# Patient Record
Sex: MALE | Race: WHITE | Employment: FULL TIME | URBAN - METROPOLITAN AREA
[De-identification: names, ages, dates, MRNs, and addresses within clinical notes are randomized per-mention and may not be internally consistent; named-entity substitution may affect disease eponyms.]

---

## 2019-08-13 LAB
CREATININE, EXTERNAL: 0.94
LDL-C, EXTERNAL: 79

## 2020-08-03 ENCOUNTER — NURSE TRIAGE (OUTPATIENT)
Dept: FAMILY MEDICINE CLINIC | Age: 35
End: 2020-08-03

## 2020-08-03 NOTE — TELEPHONE ENCOUNTER
Pt called office stating that he thinks he is having some acid reflux issues or maybe a stomach ulcer. He said that he has a dull discomfort on the R side of his abdomen under his rib cage. He noticed this weekend that he has a sour stomach, throat is irritated, and has an acid feeling in the back of his throat. He said that he is having to take 2 antiacids daily (takes a chewable one at night). He noticed that the issue is worse when he first wakes up. Pt wants to know what he should do or what you recommend he do?

## 2020-08-14 ENCOUNTER — VIRTUAL VISIT (OUTPATIENT)
Dept: FAMILY MEDICINE CLINIC | Age: 35
End: 2020-08-14
Payer: COMMERCIAL

## 2020-08-14 DIAGNOSIS — K21.9 GASTROESOPHAGEAL REFLUX DISEASE, ESOPHAGITIS PRESENCE NOT SPECIFIED: ICD-10-CM

## 2020-08-14 DIAGNOSIS — R10.9 ABDOMINAL DISCOMFORT: Primary | ICD-10-CM

## 2020-08-14 PROBLEM — G40.909 EPILEPSY (HCC): Status: ACTIVE | Noted: 2020-08-14

## 2020-08-14 PROCEDURE — 99213 OFFICE O/P EST LOW 20 MIN: CPT | Performed by: NURSE PRACTITIONER

## 2020-08-14 RX ORDER — DICLOFENAC SODIUM 75 MG/1
75 TABLET, DELAYED RELEASE ORAL 2 TIMES DAILY
COMMUNITY
Start: 2020-02-13 | End: 2020-08-14

## 2020-08-14 RX ORDER — PHENOL/SODIUM PHENOLATE
20 AEROSOL, SPRAY (ML) MUCOUS MEMBRANE DAILY
Qty: 60 TAB | Refills: 0 | Status: SHIPPED | OUTPATIENT
Start: 2020-08-14 | End: 2021-01-15

## 2020-08-14 RX ORDER — OXCARBAZEPINE 300 MG/1
1 TABLET, FILM COATED ORAL EVERY 12 HOURS
COMMUNITY
Start: 2018-11-11 | End: 2020-10-31

## 2020-08-14 NOTE — PATIENT INSTRUCTIONS
Gastroesophageal Reflux Disease (GERD): Care Instructions Your Care Instructions Gastroesophageal reflux disease (GERD) is the backward flow of stomach acid into the esophagus. The esophagus is the tube that leads from your throat to your stomach. A one-way valve prevents the stomach acid from backing up into this tube. When you have GERD, this valve does not close tightly enough. This can also cause pain and swelling in your esophagus (esophagitis). If you have mild GERD symptoms including heartburn, you may be able to control the problem with antacids or over-the-counter medicine. Changing your diet and eating habits, such as not eating late at night, losing weight, and making other lifestyle changes can also help reduce symptoms. Follow-up care is a key part of your treatment and safety. Be sure to make and go to all appointments, and call your doctor if you are having problems. It's also a good idea to know your test results and keep a list of the medicines you take. How can you care for yourself at home? · Take your medicines exactly as prescribed. Call your doctor if you think you are having a problem with your medicine. · Your doctor may recommend over-the-counter medicine. For mild or occasional indigestion, antacids, such as Tums, Gaviscon, Mylanta, or Maalox, may help. Your doctor also may recommend over-the-counter acid reducers, such as Pepcid AC (famotidine), Tagamet HB (cimetidine), or Prilosec (omeprazole). Read and follow all instructions on the label. If you use these medicines often, talk with your doctor. · Change your eating habits. ? It's best to eat several small meals instead of two or three large meals. ? After you eat, wait 2 to 3 hours before you lie down. ? Chocolate, mint, and alcohol can make GERD worse. ? Spicy foods, foods that have a lot of acid (like tomatoes and oranges), and coffee can make GERD symptoms worse in some people.  If your symptoms are worse after you eat a certain food, you may want to stop eating that food to see if your symptoms get better. · Do not smoke or chew tobacco. Smoking can make GERD worse. If you need help quitting, talk to your doctor about stop-smoking programs and medicines. These can increase your chances of quitting for good. · If you have GERD symptoms at night, raise the head of your bed 6 to 8 inches by putting the frame on blocks or placing a foam wedge under the head of your mattress. (Adding extra pillows does not work.) · Do not wear tight clothing around your middle. · Lose weight if you need to. Losing just 5 to 10 pounds can help. When should you call for help? Call your doctor now or seek immediate medical care if: 
· You have new or different belly pain. · Your stools are black and tarlike or have streaks of blood. Watch closely for changes in your health, and be sure to contact your doctor if: 
· Your symptoms have not improved after 2 days. · Food seems to catch in your throat or chest. 
Where can you learn more? Go to http://gilberto-mart.info/ Enter P906 in the search box to learn more about \"Gastroesophageal Reflux Disease (GERD): Care Instructions. \" Current as of: August 12, 2019               Content Version: 12.5 © 9137-7341 [employer]. Care instructions adapted under license by [employer] (which disclaims liability or warranty for this information). If you have questions about a medical condition or this instruction, always ask your healthcare professional. [place] [address] any warranty or liability for your use of this information.

## 2020-08-14 NOTE — PROGRESS NOTES
Consent: Marisel Velazquez, who was seen by synchronous (real-time) audio-video technology, and/or his healthcare decision maker, is aware that this patient-initiated, Telehealth encounter on 8/14/2020 is a billable service, with coverage as determined by his insurance carrier. He is aware that he may receive a bill and has provided verbal consent to proceed: YES-Consent obtained within past 12 months        712  Subjective: Marisel Velazquez is a 37 y.o. male who was seen for GERD  Patient presents to discuss concerns about GERD. Approx 6 weeks ago started experiencing acidic taste in mouth. 3 weeks ago started experiencing generalized abdominal discomfort described as mild, hunger, empty, bloated and tight. Over the past week bloating, acidic taste, and sour breath have increased. Eating food briefly improves symptoms. Symptoms are most noticeable at night when laying flat. Denies sharp pain, fever, diarrhea and constipation. Denies blood and mucous in stools. Denies pain with defecation. Denies change in bowel habits. Denies h/o of H. Pylori. Started Pepcid 1-2 times daily and Tums 1-2 times per day for the past week which offers temporary improvement. Experienced constipation and hemorrhoids end of June which improved with Miralax. Prior to Admission medications    Medication Sig Start Date End Date Taking? Authorizing Provider   OXcarbazepine (TRILEPTAL) 300 mg tablet Take 1 Tab by mouth every twelve (12) hours. 11/11/18  Yes Provider, Historical   Omeprazole delayed release (PRILOSEC D/R) 20 mg tablet Take 1 Tab by mouth daily. 8/14/20  Yes Cory Huang NP   diclofenac EC (VOLTAREN) 75 mg EC tablet Take 75 mg by mouth two (2) times a day. 2/13/20 8/14/20  Provider, Historical     No Known Allergies  Patient Active Problem List    Diagnosis    Epilepsy ([facility] [address]         ROS    Objective:   Vital Signs: (As obtained by patient/caregiver at home)  There were no vitals taken for this visit. [INSTRUCTIONS:  \"[x]\" Indicates a positive item  \"[]\" Indicates a negative item  -- DELETE ALL ITEMS NOT EXAMINED]    Constitutional: [x] Appears well-developed and well-nourished [x] No apparent distress      [] Abnormal -     Mental status: [x] Alert and awake  [x] Oriented to person/place/time [x] Able to follow commands    [] Abnormal -     Eyes:   EOM    [x]  Normal    [] Abnormal -   Sclera  [x]  Normal    [] Abnormal -          Discharge [x]  None visible   [] Abnormal -     HENT: [x] Normocephalic, atraumatic  [] Abnormal -   [x] Mouth/Throat: Mucous membranes are moist    External Ears [x] Normal  [] Abnormal -    Neck: [x] No visualized mass [] Abnormal -     Pulmonary/Chest: [x] Respiratory effort normal   [x] No visualized signs of difficulty breathing or respiratory distress        [] Abnormal -        Neurological:        [x] No Facial Asymmetry (Cranial nerve 7 motor function) (limited exam due to video visit)          [x] No gaze palsy        [] Abnormal -          Skin:        [x] No significant exanthematous lesions or discoloration noted on facial skin         [] Abnormal -            Psychiatric:       [x] Normal Affect [] Abnormal -        [x] No Hallucinations    Other pertinent observable physical exam findings:-              Assessment & Plan:   Diagnoses and all orders for this visit:    1. Abdominal discomfort  He is going to start omeprazole as ordered today. He will schedule nurse visit in the next 1 to 2 weeks for H. pylori testing. Also encouraged him to keep a dietary and symptom log to help identify triggers and patterns. We reviewed red flag symptoms and when to seek care at UC/ED. Call for any ongoing concerns. 2. Gastroesophageal reflux disease, esophagitis presence not specified  As above. Omeprazole as ordered. We discussed non-pharmacologic ways to manage reflux as well and common triggers.         Follow-up and Dispositions    · Return in about 1 week (around 8/21/2020) for Nurse visit- H Pylori . We discussed the expected course, resolution and complications of the diagnosis(es) in detail. Medication risks, benefits, costs, interactions, and alternatives were discussed as indicated. I advised him to contact the office if his condition worsens, changes or fails to improve as anticipated. He expressed understanding with the diagnosis(es) and plan. Chuy Ross is a 37 y.o. male being evaluated by a video visit encounter for concerns as above. A caregiver was present when appropriate. Due to this being a TeleHealth encounter (During OTCMX-38 public health emergency), evaluation of the following organ systems was limited: Vitals/Constitutional/EENT/Resp/CV/GI//MS/Neuro/Skin/Heme-Lymph-Imm. Pursuant to the emergency declaration under the [facility], 1135 waiver authority and the [email or web address] and [employer] Act, this Virtual  Visit was conducted, with patient's (and/or legal guardian's) consent, to reduce the patient's risk of exposure to COVID-19 and provide necessary medical care. Services were provided through a video synchronous discussion virtually to substitute for in-person clinic visit. Patient and provider were located at their individual homes.         Bhavani Florez NP

## 2020-08-20 VITALS
BODY MASS INDEX: 19.74 KG/M2 | SYSTOLIC BLOOD PRESSURE: 120 MMHG | HEART RATE: 76 BPM | OXYGEN SATURATION: 98 % | DIASTOLIC BLOOD PRESSURE: 72 MMHG | HEIGHT: 71 IN | WEIGHT: 141 LBS | TEMPERATURE: 98.7 F

## 2020-08-26 ENCOUNTER — TELEPHONE (OUTPATIENT)
Dept: FAMILY MEDICINE CLINIC | Age: 35
End: 2020-08-26

## 2020-08-26 ENCOUNTER — CLINICAL SUPPORT (OUTPATIENT)
Dept: FAMILY MEDICINE CLINIC | Age: 35
End: 2020-08-26
Payer: COMMERCIAL

## 2020-08-26 DIAGNOSIS — R10.84 GENERALIZED ABDOMINAL PAIN: Primary | ICD-10-CM

## 2020-08-26 DIAGNOSIS — A04.8 BACTERIAL INFECTION DUE TO H. PYLORI: Primary | ICD-10-CM

## 2020-08-26 LAB
H PYLORI AG TISS QL IMSTN: POSITIVE
VALID INTERNAL CONTROL?: YES

## 2020-08-26 PROCEDURE — 86677 HELICOBACTER PYLORI ANTIBODY: CPT | Performed by: NURSE PRACTITIONER

## 2020-08-26 RX ORDER — PHENOL/SODIUM PHENOLATE
20 AEROSOL, SPRAY (ML) MUCOUS MEMBRANE 2 TIMES DAILY
Qty: 28 TAB | Refills: 0 | Status: SHIPPED | OUTPATIENT
Start: 2020-08-26 | End: 2020-09-09

## 2020-08-26 RX ORDER — AMOXICILLIN 500 MG/1
1000 CAPSULE ORAL 2 TIMES DAILY
Qty: 56 CAP | Refills: 0 | Status: SHIPPED | OUTPATIENT
Start: 2020-08-26 | End: 2020-09-09

## 2020-08-26 RX ORDER — CLARITHROMYCIN 500 MG/1
500 TABLET, FILM COATED ORAL 2 TIMES DAILY
Qty: 28 TAB | Refills: 0 | Status: SHIPPED | OUTPATIENT
Start: 2020-08-26 | End: 2020-09-09

## 2020-08-26 NOTE — TELEPHONE ENCOUNTER
Yes and no. I'm prescribing Omeprazole 20mg but he needs to start taking it twice daily. I just sent a 14 day supply of all meds for treatment to pharmacy.

## 2020-08-26 NOTE — PROGRESS NOTES
I am sending triple therapy to his pharmacy- two antibiotics and Omeprazole for him to take for 14 days.  If symptoms persist, call for GI referral.

## 2020-10-31 RX ORDER — OXCARBAZEPINE 300 MG/1
TABLET, FILM COATED ORAL
Qty: 180 TAB | Refills: 1 | Status: SHIPPED | OUTPATIENT
Start: 2020-10-31 | End: 2021-12-21 | Stop reason: ALTCHOICE

## 2021-01-15 ENCOUNTER — VIRTUAL VISIT (OUTPATIENT)
Dept: FAMILY MEDICINE CLINIC | Age: 36
End: 2021-01-15
Payer: COMMERCIAL

## 2021-01-15 DIAGNOSIS — F10.920 ALCOHOLIC INTOXICATION WITHOUT COMPLICATION (HCC): ICD-10-CM

## 2021-01-15 DIAGNOSIS — F41.1 GENERALIZED ANXIETY DISORDER: ICD-10-CM

## 2021-01-15 DIAGNOSIS — F41.0 PANIC ATTACK: ICD-10-CM

## 2021-01-15 DIAGNOSIS — E86.0 DEHYDRATION: Primary | ICD-10-CM

## 2021-01-15 DIAGNOSIS — G40.919 INTRACTABLE EPILEPSY WITHOUT STATUS EPILEPTICUS, UNSPECIFIED EPILEPSY TYPE (HCC): ICD-10-CM
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
## 2021-01-15 PROBLEM — S20.219A CONTUSION OF CHEST WALL: Status: RESOLVED | Noted: 2021-01-15 | Resolved: 2021-01-15

## 2021-01-15 PROBLEM — S20.219A CONTUSION OF CHEST WALL: Status: ACTIVE | Noted: 2021-01-15

## 2021-01-15 PROCEDURE — 99214 OFFICE O/P EST MOD 30 MIN: CPT | Performed by: NURSE PRACTITIONER

## 2021-01-15 RX ORDER — SERTRALINE HYDROCHLORIDE 100 MG/1
100 TABLET, FILM COATED ORAL DAILY
Qty: 90 TAB | Refills: 0 | Status: SHIPPED | OUTPATIENT
Start: 2021-01-15 | End: 2021-06-09

## 2021-01-15 RX ORDER — DICLOFENAC SODIUM 20 MG/G
SOLUTION TOPICAL
COMMUNITY
Start: 2020-08-25 | End: 2021-01-21 | Stop reason: ALTCHOICE

## 2021-01-15 RX ORDER — OMEPRAZOLE 20 MG/1
1 TABLET, DELAYED RELEASE ORAL DAILY
COMMUNITY
Start: 2020-08-14 | End: 2021-01-15

## 2021-01-15 RX ORDER — METHOCARBAMOL 500 MG/1
500 TABLET, FILM COATED ORAL
COMMUNITY
Start: 2021-01-06 | End: 2021-01-21 | Stop reason: ALTCHOICE

## 2021-01-15 RX ORDER — LORAZEPAM 1 MG/1
1 TABLET ORAL
COMMUNITY
Start: 2021-01-02 | End: 2021-01-21 | Stop reason: ALTCHOICE

## 2021-01-15 RX ORDER — HYDROXYZINE 25 MG/1
25 TABLET, FILM COATED ORAL
Qty: 30 TAB | Refills: 0 | Status: SHIPPED | OUTPATIENT
Start: 2021-01-15 | End: 2021-01-25

## 2021-01-15 NOTE — PROGRESS NOTES
Consent: Donnie Yan, who was seen by synchronous (real-time) audio-video technology, and/or his healthcare decision maker, is aware that this patient-initiated, Telehealth encounter on 1/15/2021 is a billable service, with coverage as determined by his insurance carrier. He is aware that he may receive a bill and has provided verbal consent to proceed: YES-Consent obtained within past 12 months        712  Subjective: Donnie Yan is a 37 y.o. male who was seen for Hospital Follow Up (ER follow, [place])  This patient is here for hospital follow up. Discharge summary not available for review by me today. He is also c/o increasing anxiety since ED visit due to political climate and COVID fears. Taking Lorazepam daily for anxiety. Requesting refill today. Has not taken meds in the past for anxiety. Continues to take Trileptal daily. EEG and MRI completed last April by neurology. Neurology recommended stopping meds but he preferred to continue due fear of having a seizure and possible hospitalization during [place]. Neurology was agreeable to continue med since it is a low dose. Continues to decline seizure since 2011. ED visit dates: 1/2/2021 at [facility]. Seen for c/o vomiting, headache, rigid extremities, feeling of doom, and SOB. Reports excessive drinking with no water or food intake on 12/31 and 1/1. Currently in counseling for alcohol abuse. Discharge diagnosis: dehydration, panic attack, alcohol intoxication    Treatment: IVFs, labs reported be normal, and sedative via IV. Discharged home with Lorazepam.     Specialist follow up appointments: PCP today    Update: Since discharge patient denies any fevers, shortness of breath, lower extremity edema, diarrhea, and constipation. Medication lists reconciled, home meds have been resumed with no questions. Prior to Admission medications    Medication Sig Start Date End Date Taking?  Authorizing Provider diclofenac sodium (Pennsaid) 20 mg/gram /actuation(2 %) sopm Apply 1-2 pumps over affected area 2x daily for 2 min 8/25/20  Yes Provider, Historical   LORazepam (ATIVAN) 1 mg tablet 1 mg. 1/2/21  Yes Provider, Historical   methocarbamoL (ROBAXIN) 500 mg tablet Take 500 mg by mouth. 1/6/21 1/26/21 Yes Provider, Historical   sertraline (ZOLOFT) 100 mg tablet Take 1 Tab by mouth daily. 1/15/21  Yes Nalini Medina NP   hydrOXYzine HCL (ATARAX) 25 mg tablet Take 1 Tab by mouth three (3) times daily as needed for Anxiety for up to 10 days. 1/15/21 1/25/21 Yes Nalini Medina NP   OXcarbazepine (TRILEPTAL) 300 mg tablet TAKE 1 TABLET BY MOUTH TWICE A DAY 10/31/20  Yes Maricruz Barnes NP   omeprazole (PRILOSEC OTC) 20 mg tablet Take 1 Tab by mouth daily. 8/14/20 1/15/21  Provider, Historical   Omeprazole delayed release (PRILOSEC D/R) 20 mg tablet Take 1 Tab by mouth daily. 8/14/20 1/15/21  Nalini Medina NP     No Known Allergies  Patient Active Problem List    Diagnosis    Epilepsy ([facility] [address]         ROS  See HPI for pertinent ROS. Objective:   Vital Signs: (As obtained by patient/caregiver at home)  There were no vitals taken for this visit.      [INSTRUCTIONS:  \"[x]\" Indicates a positive item  \"[]\" Indicates a negative item  -- DELETE ALL ITEMS NOT EXAMINED]    Constitutional: [x] Appears well-developed and well-nourished [x] No apparent distress      [] Abnormal -     Mental status: [x] Alert and awake  [x] Oriented to person/place/time [x] Able to follow commands    [] Abnormal -     Eyes:   EOM    [x]  Normal    [] Abnormal -   Sclera  [x]  Normal    [] Abnormal -          Discharge [x]  None visible   [] Abnormal -     HENT: [x] Normocephalic, atraumatic  [] Abnormal -   [x] Mouth/Throat: Mucous membranes are moist    External Ears [x] Normal  [] Abnormal -    Neck: [x] No visualized mass [] Abnormal -     Pulmonary/Chest: [x] Respiratory effort normal   [x] No visualized signs of difficulty breathing or respiratory distress        [] Abnormal -        Neurological:        [x] No Facial Asymmetry (Cranial nerve 7 motor function) (limited exam due to video visit)          [x] No gaze palsy        [] Abnormal -          Skin:        [x] No significant exanthematous lesions or discoloration noted on facial skin         [] Abnormal -            Psychiatric:       [x] Normal Affect [] Abnormal -        [x] No Hallucinations    Other pertinent observable physical exam findings:-              Assessment & Plan:   Diagnoses and all orders for this visit:    1. Dehydration  Doing well since hospitalization. Has abstained from alcohol as well. Continues with counselor. 2. Panic attack  Discussed long-term risks associated with benzo use including increased risk for addiction. He is agreeable to daily medication. 3. Alcoholic intoxication without complication ([facility] [address]  Praised for alcohol cessation. Continue with counselor. 4. Generalized anxiety disorder  We discussed the increased risk for abuse with long-term use of benzos. He is agreeable to daily medication with as needed med while we are waiting for Zoloft to reach therapeutic dosing. Start Zoloft daily as ordered. Cautioned side effects are worse during the first week and then improve (feeling stimulated or sedated, upset stomach, dry mouth, headache, or sexual difficulties). Take medication daily and not stop abruptly. It can take 6-8 weeks to reach therapeutic dosing. Also warned of possible SI in some people on SSRI's. Understands to seek immediate medical attention if that should occur. Take hydroxyzine as needed for anxiety or panic attacks. Cautioned it will cause sedation and to avoid driving while taking med. -     sertraline (ZOLOFT) 100 mg tablet; Take 1 Tab by mouth daily. -     hydrOXYzine HCL (ATARAX) 25 mg tablet; Take 1 Tab by mouth three (3) times daily as needed for Anxiety for up to 10 days.     5. Intractable epilepsy without status epilepticus, unspecified epilepsy type ([facility] [address]  Continues to remain seizure-free. Encouraged to schedule a follow-up appointment with neurology to discuss discontinuing medication in the next few months. Follow-up and Dispositions    · Return in about 5 weeks (around 2/19/2021) for follow up, anxiety, VV ok. We discussed the expected course, resolution and complications of the diagnosis(es) in detail. Medication risks, benefits, costs, interactions, and alternatives were discussed as indicated. I advised him to contact the office if his condition worsens, changes or fails to improve as anticipated. He expressed understanding with the diagnosis(es) and plan. Teena Prince is a 37 y.o. male being evaluated by a video visit encounter for concerns as above. A caregiver was present when appropriate. Due to this being a TeleHealth encounter (During [facility]- public health emergency), evaluation of the following organ systems was limited: Vitals/Constitutional/EENT/Resp/CV/GI//MS/Neuro/Skin/Heme-Lymph-Imm. Pursuant to the emergency declaration under the [facility], 1135 waiver authority and the [employer] and [employer] Act, this Virtual  Visit was conducted, with patient's (and/or legal guardian's) consent, to reduce the patient's risk of exposure to COVID-19 and provide necessary medical care. Services were provided through a video synchronous discussion virtually to substitute for in-person clinic visit. Patient and provider were located at their individual homes.         Fabian Solis NP

## 2021-01-21 ENCOUNTER — VIRTUAL VISIT (OUTPATIENT)
Dept: FAMILY MEDICINE CLINIC | Age: 36
End: 2021-01-21
Payer: COMMERCIAL

## 2021-01-21 DIAGNOSIS — R20.2 TINGLING IN EXTREMITIES: ICD-10-CM

## 2021-01-21 DIAGNOSIS — F41.1 GENERALIZED ANXIETY DISORDER: Primary | ICD-10-CM

## 2021-01-21 DIAGNOSIS — K64.4 EXTERNAL HEMORRHOID: ICD-10-CM

## 2021-01-21 DIAGNOSIS — F50.89 PSYCHOGENIC VOMITING WITH NAUSEA: ICD-10-CM

## 2021-01-21 PROCEDURE — 99214 OFFICE O/P EST MOD 30 MIN: CPT | Performed by: FAMILY MEDICINE

## 2021-01-21 NOTE — PROGRESS NOTES
Consent: Ana Munguia, who was seen by synchronous (real-time) audio-video technology, and/or his healthcare decision maker, is aware that this patient-initiated, Telehealth encounter on 1/21/2021 is a billable service, with coverage as determined by his insurance carrier. He is aware that he may receive a bill and has provided verbal consent to proceed: YES-Consent obtained within past 12 months        712  Subjective: Ana Munguia is a 37 y.o. male who was seen for Anxiety, Vomiting, Hemorrhoids, and Anal Bleeding      This is an established patient of the practice that is new to me. He was seen recently for anxiety and given a prescription of Zoloft but wanted to wait until he talk to his counselor to start it. He had panic attacks during the night that he describes as his hands going stiff and numb and an overwhelming feeling of anxiety during that time. They are also accompanied by nausea and vomiting. He went to the emergency department at 2 AM this morning as well as at 8 AM.  At the 2 AM visit they gave him a GI cocktail and he was able to calm down the anxiety so they discharged him. At 8 AM they gave him a benzodiazepine and again discharged him. He also notes that he is taking diclofenac and Robaxin for some neck strain although that neck strain is not of concern to him at this time. Finally, he notes a history of external hemorrhoids that do have pain and bleeding associated with them. His hemorrhoids have recently returned and he did notice some blood just on the outside of his stool recently. This occurred once. In the last 10 days he has started using Preparation H again but has only used it about 6 of those last 10 days. ROS    Prior to Admission medications    Medication Sig Start Date End Date Taking? Authorizing Provider   sertraline (ZOLOFT) 100 mg tablet Take 1 Tab by mouth daily.  1/15/21   Arabella Guzman NP   hydrOXYzine HCL (ATARAX) 25 mg tablet Take 1 Tab by mouth three (3) times daily as needed for Anxiety for up to 10 days. 1/15/21 1/25/21  Stephania Motta NP   diclofenac sodium (Pennsaid) 20 mg/gram /actuation(2 %) sopm Apply 1-2 pumps over affected area 2x daily for 2 min 8/25/20 1/21/21  Provider, Historical   LORazepam (ATIVAN) 1 mg tablet 1 mg. 1/2/21 1/21/21  Provider, Historical   methocarbamoL (ROBAXIN) 500 mg tablet Take 500 mg by mouth. 1/6/21 1/21/21  Provider, Historical   OXcarbazepine (TRILEPTAL) 300 mg tablet TAKE 1 TABLET BY MOUTH TWICE A DAY 10/31/20   Florentin Londono NP     No Known Allergies  Patient Active Problem List    Diagnosis    Epilepsy ([facility] [address]       Objective:   Vital Signs: (As obtained by patient/caregiver at home)  There were no vitals taken for this visit.      [INSTRUCTIONS:  \"[x]\" Indicates a positive item  \"[]\" Indicates a negative item  -- DELETE ALL ITEMS NOT EXAMINED]    Constitutional: [x] Appears well-developed and well-nourished [x] No apparent distress      [] Abnormal -     Mental status: [x] Alert and awake  [x] Oriented to person/place/time [x] Able to follow commands    [] Abnormal -     Eyes:   EOM    [x]  Normal    [] Abnormal -   Sclera  [x]  Normal    [] Abnormal -          Discharge [x]  None visible   [] Abnormal -     HENT: [x] Normocephalic, atraumatic  [] Abnormal -   [] Mouth/Throat: Mucous membranes are moist    External Ears [] Normal  [] Abnormal -    Neck: [x] No visualized mass [] Abnormal -     Pulmonary/Chest: [x] Respiratory effort normal   [x] No visualized signs of difficulty breathing or respiratory distress        [] Abnormal -        Neurological:        [x] No Facial Asymmetry (Cranial nerve 7 motor function) (limited exam due to video visit)          [x] No gaze palsy        [] Abnormal -          Skin:        [x] No significant exanthematous lesions or discoloration noted on facial skin         [] Abnormal -            Psychiatric:       [] Normal Affect [x] Abnormal -anxious       [x] No Hallucinations    Other pertinent observable physical exam findings:-              Assessment & Plan:   Diagnoses and all orders for this visit:    1. Generalized anxiety disorder  I am advising him to start the Zoloft but I would start at 50 mg by cutting the tabs in half. He can take this dose for the first few weeks then increase to a whole tab if desired. He should continue to follow with his counselor. If the panic starts again he should take the Atarax immediately and try to talk himself down with deep breathing, reassurance, etc.    2. Psychogenic vomiting with nausea  I strongly believe that with the anxiety getting under better control with Zoloft that this will resolve itself. If it does not we can potentially send him to gastroenterology in the future. 3. Tingling in extremities  The symptoms are consistent with panic attacks. Treat as noted above. 4. External hemorrhoid  I am advising him to use the Preparation H daily as opposed to skipping days as he has been. If the hemorrhoid resolves and the bleeding returns he will notify the office. He will also notify the office if the blood ever appears to be within the stool and not just coating the outside. We discussed the expected course, resolution and complications of the diagnosis(es) in detail. Medication risks, benefits, costs, interactions, and alternatives were discussed as indicated. I advised him to contact the office if his condition worsens, changes or fails to improve as anticipated. He expressed understanding with the diagnosis(es) and plan. Maritza Concepcion is a 37 y.o. male being evaluated by a video visit encounter for concerns as above. A caregiver was present when appropriate. Due to this being a TeleHealth encounter (During [facility] public health emergency), evaluation of the following organ systems was limited: Vitals/Constitutional/EENT/Resp/CV/GI//MS/Neuro/Skin/Heme-Lymph-Imm.   Pursuant to the emergency declaration under the [facility], [address] [place] [address] authority and the [employer] and [employer] Act, this Virtual  Visit was conducted, with patient's (and/or legal guardian's) consent, to reduce the patient's risk of exposure to COVID-19 and provide necessary medical care. Services were provided through a video synchronous discussion virtually to substitute for in-person clinic visit. Patient and provider were located at their individual homes.         Cass Cerda MD

## 2021-01-21 NOTE — PATIENT INSTRUCTIONS
Neck Arthritis: Exercises 
Introduction 
Here are some examples of exercises for you to try. The exercises may be suggested for a condition or for rehabilitation. Start each exercise slowly. Ease off the exercises if you start to have pain. 
You will be told when to start these exercises and which ones will work best for you. 
How to do the exercises 
Neck stretches to the side  
1. This stretch works best if you keep your shoulder down as you lean away from it. To help you remember to do this, start by relaxing your shoulders and lightly holding on to your thighs or your chair. 
2. Tilt your head toward your shoulder and hold for 15 to 30 seconds. Let the weight of your head stretch your muscles. 
3. Repeat 2 to 4 times toward each shoulder.   
Chin tuck  
1. Lie on the floor with a rolled-up towel under your neck. Your head should be touching the floor. 
2. Slowly bring your chin toward your chest. 
3. Hold for a count of 6, and then relax for up to 10 seconds. 
4. Repeat 8 to 12 times.   
Active cervical rotation  
1. Sit in a firm chair, or stand up straight. 
2. Keeping your chin level, turn your head to the right, and hold for 15 to 30 seconds. 
3. Turn your head to the left and hold for 15 to 30 seconds. 
4. Repeat 2 to 4 times to each side.   
Shoulder blade squeeze  
1. While standing, squeeze your shoulder blades together. 
2. Do not raise your shoulders up as you are squeezing. 
3. Hold for 6 seconds. 
4. Repeat 8 to 12 times.   
Shoulder rolls  
1. Sit comfortably with your feet shoulder-width apart. You can also do this exercise standing up. 
2. Roll your shoulders up, then back, and then down in a smooth, circular motion. 
3. Repeat 2 to 4 times.   
Follow-up care is a key part of your treatment and safety. Be sure to make and go to all appointments, and call your doctor if you are having problems. It's also a good idea to know your test results and keep a list of the medicines you take. 
 Where can you learn more? Go to http://www.gray.com/ Enter D319 in the search box to learn more about \"Neck Arthritis: Exercises. \" Current as of: March 2, 2020               Content Version: 12.6 © 6436-2265 [employer]. Care instructions adapted under license by [employer] (which disclaims liability or warranty for this information). If you have questions about a medical condition or this instruction, always ask your healthcare professional. [address] any warranty or liability for your use of this information.

## 2021-02-19 ENCOUNTER — VIRTUAL VISIT (OUTPATIENT)
Dept: FAMILY MEDICINE CLINIC | Age: 36
End: 2021-02-19
Payer: COMMERCIAL

## 2021-02-19 DIAGNOSIS — K64.9 HEMORRHOIDS, UNSPECIFIED HEMORRHOID TYPE: ICD-10-CM

## 2021-02-19 DIAGNOSIS — K59.00 CONSTIPATION, UNSPECIFIED CONSTIPATION TYPE: ICD-10-CM

## 2021-02-19 DIAGNOSIS — R11.2 INTRACTABLE VOMITING WITH NAUSEA, UNSPECIFIED VOMITING TYPE: ICD-10-CM

## 2021-02-19 DIAGNOSIS — F41.9 ANXIETY: Primary | ICD-10-CM

## 2021-02-19 PROBLEM — K29.70 GASTRITIS: Status: ACTIVE | Noted: 2021-02-19

## 2021-02-19 PROBLEM — R10.9 ABDOMINAL PAIN: Status: ACTIVE | Noted: 2021-02-19

## 2021-02-19 PROCEDURE — 99213 OFFICE O/P EST LOW 20 MIN: CPT | Performed by: NURSE PRACTITIONER

## 2021-02-19 RX ORDER — METHOCARBAMOL 500 MG/1
TABLET, FILM COATED ORAL
COMMUNITY
Start: 2021-02-14 | End: 2021-12-21 | Stop reason: ALTCHOICE

## 2021-02-19 RX ORDER — FAMOTIDINE 40 MG/1
TABLET, FILM COATED ORAL
COMMUNITY
Start: 2021-01-21 | End: 2021-12-21 | Stop reason: ALTCHOICE

## 2021-02-19 NOTE — PROGRESS NOTES
Consent: Amari Damon, who was seen by synchronous (real-time) audio-video technology, and/or his healthcare decision maker, is aware that this patient-initiated, Telehealth encounter on 2/19/2021 is a billable service, with coverage as determined by his insurance carrier. He is aware that he may receive a bill and has provided verbal consent to proceed: YES-Consent obtained within past 12 months        712  Subjective:   Amari Damon is a 35 y.o. male who was seen for Follow-up, Epilepsy, and Anxiety  Patient presents for management of anxiety. After his last visit, he started Sertraline for 2-3 days and then stopped. Prior to this he stopped taking Voltaren daily. A few days after stopping Voltaren, GI issues (nausea and vomiting) resolved. He now knows his anxiety was triggered by GI issues secondary to Voltaren po. Since GI symptoms have resolved, he has had no anxiety and no panic attacks.  He also attributed anxiety to the political climate that he now feels have improved. Continues to follow with a counselor weekly and will continue to do so for the next 1 -1 1/2 years.   Also c/o ongoing hemorrhoids. Has not been as consistent with OTC meds until 10 days ago.  Now that he is more consistent, hemorrhoids are improving.  Also c/o constipation for the past 2-3 months. 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     Prior to Admission medications    Medication Sig Start Date End Date Taking? Authorizing Provider   famotidine (PEPCID) 40 mg tablet TAKE 1 TABLET BY MOUTH TWICE A DAY 1/21/21  Yes Provider, Historical   methocarbamoL (ROBAXIN) 500 mg tablet  2/14/21  Yes Provider, Historical   OXcarbazepine (TRILEPTAL) 300 mg tablet TAKE 1 TABLET BY MOUTH TWICE A DAY 10/31/20  Yes Agusto Escudero NP   sertraline (ZOLOFT) 100 mg tablet Take 1 Tab by mouth daily. 1/15/21   Ashlee Darnell NP     No Known Allergies  Patient Active Problem List     Diagnosis    Abdominal pain    Anxiety    Gastritis    Epilepsy ([facility] [address]         ROS  See HPI for pertinent ROS. Objective:   Vital Signs: (As obtained by patient/caregiver at home)  There were no vitals taken for this visit. [INSTRUCTIONS:  \"[x]\" Indicates a positive item  \"[]\" Indicates a negative item  -- DELETE ALL ITEMS NOT EXAMINED]    Constitutional: [x] Appears well-developed and well-nourished [x] No apparent distress      [] Abnormal -     Mental status: [x] Alert and awake  [x] Oriented to person/place/time [x] Able to follow commands    [] Abnormal -     Eyes:   EOM    [x]  Normal    [] Abnormal -   Sclera  [x]  Normal    [] Abnormal -          Discharge [x]  None visible   [] Abnormal -     HENT: [x] Normocephalic, atraumatic  [] Abnormal -   [x] Mouth/Throat: Mucous membranes are moist    External Ears [x] Normal  [] Abnormal -    Neck: [x] No visualized mass [] Abnormal -     Pulmonary/Chest: [x] Respiratory effort normal   [x] No visualized signs of difficulty breathing or respiratory distress        [] Abnormal -        Neurological:        [x] No Facial Asymmetry (Cranial nerve 7 motor function) (limited exam due to video visit)          [x] No gaze palsy        [] Abnormal -          Skin:        [x] No significant exanthematous lesions or discoloration noted on facial skin         [] Abnormal -            Psychiatric:       [x] Normal Affect [] Abnormal -        [x] No Hallucinations    Other pertinent observable physical exam findings:-              Assessment & Plan:   Diagnoses and all orders for this visit:    1. Anxiety  Now well controlled off medication. 2. Intractable vomiting with nausea, unspecified vomiting type  Resolved since discontinuing Voltaren p.o. daily. Symptoms were causing anxiety which is now resolved as well. 3. 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   Also likely contributing to c/o hemorrhoids. Encouraged to increase fluid intake, increase activity, and increase fiber in diet. He can also try MiraLAX or stool softeners as needed. 4. Hemorrhoids, unspecified hemorrhoid type  Likely secondary to above. Improving with over-the-counter topical treatment. Avoid constipation as discussed above as well. Call for GI referral if symptoms persist.        We discussed the expected course, resolution and complications of the diagnosis(es) in detail. Medication risks, benefits, costs, interactions, and alternatives were discussed as indicated. I advised him to contact the office if his condition worsens, changes or fails to improve as anticipated. He expressed understanding with the diagnosis(es) and plan. Fahad Baeza is a 37 y.o. male being evaluated by a video visit encounter for concerns as above. A caregiver was present when appropriate. Due to this being a TeleHealth encounter (During [facility]-47 public health emergency), evaluation of the following organ systems was limited: Vitals/Constitutional/EENT/Resp/CV/GI//MS/Neuro/Skin/Heme-Lymph-Imm. Pursuant to the emergency declaration under the [facility]5 waiver authority and the [employer] and [employer] Act, this Virtual  Visit was conducted, with patient's (and/or legal guardian's) consent, to reduce the patient's risk of exposure to COVID-19 and provide necessary medical care. Services were provided through a video synchronous discussion virtually to substitute for in-person clinic visit. Patient and provider were located at their individual homes.         Dieudonne Pelayo NP

## 2021-06-09 ENCOUNTER — OFFICE VISIT (OUTPATIENT)
Dept: FAMILY MEDICINE CLINIC | Age: 36
End: 2021-06-09
Payer: COMMERCIAL

## 2021-06-09 VITALS
TEMPERATURE: 97.7 F | RESPIRATION RATE: 15 BRPM | WEIGHT: 138.2 LBS | HEART RATE: 73 BPM | DIASTOLIC BLOOD PRESSURE: 88 MMHG | BODY MASS INDEX: 19.35 KG/M2 | SYSTOLIC BLOOD PRESSURE: 120 MMHG | OXYGEN SATURATION: 97 % | HEIGHT: 71 IN

## 2021-06-09 DIAGNOSIS — R09.81 NASAL CONGESTION: ICD-10-CM

## 2021-06-09 DIAGNOSIS — Z20.822 SUSPECTED COVID-19 VIRUS INFECTION: ICD-10-CM

## 2021-06-09 DIAGNOSIS — J30.2 SEASONAL ALLERGIC RHINITIS, UNSPECIFIED TRIGGER: ICD-10-CM

## 2021-06-09 DIAGNOSIS — J06.9 VIRAL URI WITH COUGH: ICD-10-CM

## 2021-06-09 PROCEDURE — 99213 OFFICE O/P EST LOW 20 MIN: CPT | Performed by: NURSE PRACTITIONER

## 2021-06-09 RX ORDER — CETIRIZINE HCL 10 MG
10 TABLET ORAL
Qty: 30 TABLET | Refills: 0 | Status: SHIPPED | OUTPATIENT
Start: 2021-06-09 | End: 2021-07-05

## 2021-06-09 NOTE — PATIENT INSTRUCTIONS
Managing Your Allergies: Care Instructions  Your Care Instructions     Managing your allergies is an important part of staying healthy. Your doctor will help you find out what may be the cause of the allergies. Common causes of symptoms are house dust and dust mites, animal dander, mold, and pollen. As soon as you know what triggers your symptoms, try to avoid those things. This can help prevent allergy symptoms, asthma, and other health problems. Ask your doctor about allergy medicine or immunotherapy. These treatments may help reduce or prevent allergy symptoms. Follow-up care is a key part of your treatment and safety. Be sure to make and go to all appointments, and call your doctor if you are having problems. It's also a good idea to know your test results and keep a list of the medicines you take. How can you care for yourself at home? · If you have been told by your doctor that dust or dust mites are causing your allergy, decrease the dust around your bed:  ? Wash sheets, pillowcases, and other bedding in hot water every week. ? Use dust-proof covers for pillows, duvets, and mattresses. Avoid plastic covers because they tear easily and do not \"breathe. \" Wash as instructed on the label. ? Do not use any blankets and pillows that you do not need. ? Use blankets that you can wash in your washing machine. ? Consider removing drapes and carpets, which attract and hold dust, from your bedroom. · If you are allergic to house dust and mites, do not use home humidifiers. Your doctor can suggest ways you can control dust and mites. · Look for signs of cockroaches. Cockroaches cause allergic reactions. Use cockroach baits to get rid of them. Then, clean your home well. Cockroaches like areas where grocery bags, newspapers, empty bottles, or cardboard boxes are stored. Do not keep these inside your home, and keep trash and food containers sealed.  Seal off any spots where cockroaches might enter your home.  · If you are allergic to mold, get rid of furniture, rugs, and drapes that smell musty. Check for mold in the bathroom. · If you are allergic to outdoor pollen or mold spores, use air-conditioning. Change or clean all filters every month. Keep windows closed. · If you are allergic to pollen, stay inside when pollen counts are high. Use a vacuum  with a HEPA filter or a double-thickness filter at least two times each week. · Stay inside when air pollution is bad. Avoid paint fumes, perfumes, and other strong odors. · Avoid conditions that make your allergies worse. Stay away from smoke. Do not smoke or let anyone else smoke in your house. Do not use fireplaces or wood-burning stoves. · If you are allergic to your pets, change the air filter in your furnace every month. Use high-efficiency filters. · If you are allergic to pet dander, keep pets outside or out of your bedroom. Old carpet and cloth furniture can hold a lot of animal dander. You may need to replace them. When should you call for help? Give an epinephrine shot if:    · You think you are having a severe allergic reaction. After giving an epinephrine shot call 911, even if you feel better. Call 911 if:    · You have symptoms of a severe allergic reaction. These may include:  ? Sudden raised, red areas (hives) all over your body. ? Swelling of the throat, mouth, lips, or tongue. ? Trouble breathing. ? Passing out (losing consciousness). Or you may feel very lightheaded or suddenly feel weak, confused, or restless.     · You have been given an epinephrine shot, even if you feel better. Call your doctor now or seek immediate medical care if:    · You have symptoms of an allergic reaction, such as:  ? A rash or hives (raised, red areas on the skin). ? Itching. ? Swelling. ? Belly pain, nausea, or vomiting.    Watch closely for changes in your health, and be sure to contact your doctor if:    · Your allergies get worse.     · You need help controlling your allergies.     · You have questions about allergy testing.     · You do not get better as expected. Where can you learn more? Go to http://www.gray.com/  Enter L249 in the search box to learn more about \"Managing Your Allergies: Care Instructions. \"  Current as of: November 6, 2020               Content Version: 12.8  © 1022-7406 [employer]. Care instructions adapted under license by [employer] (which disclaims liability or warranty for this information). If you have questions about a medical condition or this instruction, always ask your healthcare professional. [place] [address] any warranty or liability for your use of this information.

## 2021-06-09 NOTE — PROGRESS NOTES
Rebecca Pompa is a 37 y.o. male who was seen in clinic today (6/9/2021) for an acute visit. Assessment/Plan:            * COVID-19 sample collected and submitted       * Patient given detailed CDC instructions contained within After Visit Summary    Diagnoses and all orders for this visit:    1. Viral URI with cough    2. Suspected COVID-19 virus infection  -     NOVEL CORONAVIRUS (COVID-19); Future    3. Nasal congestion  -     cetirizine (ZYRTEC) 10 mg tablet; Take 1 Tablet by mouth nightly. Indications: inflammation of the nose due to an allergy    4. Seasonal allergic rhinitis, unspecified trigger  -     cetirizine (ZYRTEC) 10 mg tablet; Take 1 Tablet by mouth nightly. Indications: inflammation of the nose due to an allergy       Suspected allergic rhinitis with overlapping possible Covid symptoms,.  I have low suspicion for COVID-19 due to the patient being fully vaccinated since February. We discussed expected course/resolution/complications of diagnosis in detail with patient. Advised pt on CDC guidance, OTC medications for symptom management, red flags reviewed and should develop to seek emergency medical attn. Reviewed with him that COVID-19 pandemic is an evolving situation with rapidly changing recommendations & guidelines, continue to practice hand hygiene, social distancing, wearing of facial coverings. Regardless of testing results, should still follow CDC guidelines as there is a chance of a false negative, or symptoms may be due to a cold or flu which are also transmissible. Medical decisions are made based on the the best information available at the time. Recommended he stay tuned for updates published by trusted sources and to advise your PCP of any unexpected changes in clinical condition     Subjective: Tony Etienne was seen today for Sinus Infection  Works as a teacher. Since Sunday am, woke up with drainage, congestion, pressure in the head, cough, wakes up with chest soreness. One night he had chills but this has since resolved resolved. He notes ongoing and chronic fatigue, which she attributes to his work as a teacher and the end of the school year, but maybe a little more fatigued than usual.  He also notes occasional stomach upset and has been taking pepcid. He denies a recent history/current: loss of smell/taste, fever, wheezing, SOB, ADKINS, n,v,d.  No sick contacts and no covid 19 contacts. He has also been taking mucinex twice a day since Monday, has been using flonase which he used twice yesterday and twice today with some relief. Travel Screening     Question   Response    In the last month, have you been in contact with someone who was confirmed or suspected to have Coronavirus / COVID-19? No / Unsure    Have you had a COVID-19 viral test in the last 14 days? No    Do you have any of the following new or worsening symptoms? Runny nose; Abdominal pain;Muscle pain; Fatigue; Chills;Cough; Sore throat    Have you traveled internationally or domestically in the last month? No      Travel History   Travel since 05/09/21     No documented travel since 05/09/21          ROS - Pertinent items are noted in HPI    Patient Active Problem List   Diagnosis Code    [facility] G40.909    Abdominal pain R10.9    Anxiety F41.9    Gastritis K29.70     Home Medications    Medication Sig Start Date End Date Taking? Authorizing Provider   cetirizine (ZYRTEC) 10 mg tablet Take 1 Tablet by mouth nightly. Indications: inflammation of the nose due to an allergy 6/9/21  Yes Waldemar Gasca NP   famotidine (PEPCID) 40 mg tablet TAKE 1 TABLET BY MOUTH TWICE A DAY 1/21/21  Yes Provider, Historical   methocarbamoL (ROBAXIN) 500 mg tablet  2/14/21  Yes Provider, Historical   OXcarbazepine (TRILEPTAL) 300 mg tablet TAKE 1 TABLET BY MOUTH TWICE A DAY 10/31/20  Yes Waldemar Gasca NP   sertraline (ZOLOFT) 100 mg tablet Take 1 Tab by mouth daily.  1/15/21 6/9/21  Varsha Nava NP      No Known Allergies       Objective:   Physical Exam  General:  alert, cooperative, no distress   Ears: Normal external ear canals AU, normal TMs bilateral   Sinuses:  Maxillary sinus pressure mild   Mouth:  Lips, mucosa, and tongue normal. Teeth and gums normal: oropharynx: clear   Neck: supple, symmetrical, trachea midline. No adenopathy   Heart: normal rate, regular rhythm, normal S1, S2, no murmurs, rubs, clicks or gallops. Lungs: clear to auscultation bilaterally       Visit Vitals  /88 (BP 1 Location: Left upper arm, BP Patient Position: Sitting)   Pulse 73   Temp 97.7 °F (36.5 °C) (Temporal)   Resp 15   Ht 5' 11\" (1.803 m)   Wt 138 lb 3.2 oz (62.7 kg)   SpO2 97%   BMI 19.27 kg/m²         Disclaimer:        Medication risks/benefits/costs/interactions/alternatives discussed with patient. He was given an after visit summary which includes diagnoses, current medications, & vitals. He expressed understanding with the diagnosis and plan. Aspects of this note may have been generated using voice recognition software. Despite editing, there may be some syntax errors.        Lee Cooney NP

## 2021-06-10 LAB
SARS-COV-2, NAA 2 DAY TAT: NORMAL
SARS-COV-2, NAA: NOT DETECTED

## 2021-10-05 ENCOUNTER — TELEPHONE (OUTPATIENT)
Dept: FAMILY MEDICINE CLINIC | Age: 36
End: 2021-10-05

## 2021-10-05 NOTE — TELEPHONE ENCOUNTER
Pt stated that he has officially come off the seizure med in early September. He also mentioned that the neurologist said that the daily use of Marijuana that not using the marijuana increases his risk of seizure and if this warrants him getting medicinal and what you thought about it. 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

## 2021-10-05 NOTE — TELEPHONE ENCOUNTER
Would like to give a update on his seizure medication. Saw a Doctor at  [facility] and they adjusted it.  Also has a question about the covid booster

## 2021-10-08 ENCOUNTER — TELEPHONE (OUTPATIENT)
Dept: FAMILY MEDICINE CLINIC | Age: 36
End: 2021-10-08

## 2021-10-08 NOTE — TELEPHONE ENCOUNTER
Patient called, stating he has an additional question to ask [address] [facility].    Call [phone].

## 2021-10-12 NOTE — TELEPHONE ENCOUNTER
Spoke to pt he wanted to know that since UNTERROHR agreed with the neurologist about the daily use of the Euna [place]ira if that would constitute him getting a medicinal card/script. Advised pt that we could not do that, because we are not treating his seizures his neurologist is and he would have to do that for him.   Pt agreed and stated that he would contact is neurologist.

## 2021-12-21 ENCOUNTER — OFFICE VISIT (OUTPATIENT)
Dept: FAMILY MEDICINE CLINIC | Age: 36
End: 2021-12-21
Payer: COMMERCIAL

## 2021-12-21 VITALS
RESPIRATION RATE: 16 BRPM | DIASTOLIC BLOOD PRESSURE: 74 MMHG | BODY MASS INDEX: 18.9 KG/M2 | WEIGHT: 135 LBS | HEART RATE: 96 BPM | HEIGHT: 71 IN | SYSTOLIC BLOOD PRESSURE: 125 MMHG | TEMPERATURE: 97.8 F | OXYGEN SATURATION: 100 %

## 2021-12-21 DIAGNOSIS — F41.9 ANXIETY AND DEPRESSION: Primary | ICD-10-CM

## 2021-12-21 DIAGNOSIS — F32.A ANXIETY AND DEPRESSION: Primary | ICD-10-CM

## 2021-12-21 DIAGNOSIS — Z13.31 POSITIVE DEPRESSION SCREENING: ICD-10-CM

## 2021-12-21 DIAGNOSIS — F41.0 PANIC ATTACKS: ICD-10-CM

## 2021-12-21 DIAGNOSIS — F10.90 ALCOHOL INTAKE ABOVE RECOMMENDED SENSIBLE LIMITS: ICD-10-CM

## 2021-12-21 PROCEDURE — 99214 OFFICE O/P EST MOD 30 MIN: CPT | Performed by: NURSE PRACTITIONER

## 2021-12-21 RX ORDER — LORAZEPAM 0.5 MG/1
0.5 TABLET ORAL
Qty: 15 TABLET | Refills: 0 | Status: SHIPPED | OUTPATIENT
Start: 2021-12-21 | End: 2022-06-01 | Stop reason: SDUPTHER

## 2021-12-21 RX ORDER — SERTRALINE HYDROCHLORIDE 100 MG/1
TABLET, FILM COATED ORAL
Qty: 90 TABLET | Refills: 0 | Status: SHIPPED | OUTPATIENT
Start: 2021-12-21 | End: 2022-05-16

## 2021-12-21 NOTE — PROGRESS NOTES
Subjective  Chief Complaint   Patient presents with    Other     anxiety/depression     HPI:  Adama Rincon is a 31 y.o. male. Patient presents to discuss worsening anxiety and depression. Reports history of anxiety and depression in the past for which he is taking sertraline. Weaned off medication earlier this year preferring to manage with counseling. Stopped counseling in June. Anxiety and depression have been worsening over the past several months. Panic attacks are triggered by drinking alcohol and he has been seen at various EDs 6-7 times for panic attacks. Currently goes days to weeks without drinking. 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 Reports 2-3 or 6-8 drinks on days he does drink. Had a few Atarax left over which has not helped panic attacks. Denies SI and HI.      Past Medical History:   Diagnosis Date    [facility] ([facility]      Family History   Problem Relation Age of Onset    Cancer Maternal Grandmother     Cancer Maternal Grandfather     No Known Problems Mother     No Known Problems Father      Social History     Socioeconomic History    Marital status: SINGLE     Spouse name: Not on file    Number of children: Not on file    Years of education: Not on file    Highest education level: Not on file   Occupational History    Not on file   Tobacco Use    Smoking status: Never Smoker    Smokeless tobacco: Never Used   Vaping Use    Vaping Use: Never used   Substance and Sexual Activity    Alcohol use: Not Currently    Drug use: Yes     Types: Marijuana    Sexual activity: Yes   Other Topics Concern    Not on file   Social History Narrative    Not on file     Social Determinants of Health     Financial Resource Strain:     Difficulty of Paying Living Expenses: Not on file   Food Insecurity:     Worried About Running Out of Food in the Last Year: Not on file    Jose Carlos of Food in the Last Year: Not on file   Transportation Needs:     Lack of Transportation (Medical): Not on file    Lack of Transportation (Non-Medical): Not on file   Physical Activity:     Days of Exercise per Week: Not on file    Minutes of Exercise per Session: Not on file   Stress:     Feeling of Stress : Not on file   Social Connections:     Frequency of Communication with Friends and Family: Not on file    Frequency of Social Gatherings with Friends and Family: Not on file    Attends Cheondo[employer] Services: Not on file    Active Member of [address] [place] [address] or Organizations: Not on file    Attends Club or Organization Meetings: Not on file    Marital Status: Not on file   Intimate Partner Violence:     Fear of Current or Ex-Partner: Not on file    Emotionally Abused: Not on file    Physically Abused: Not on file    Sexually Abused: Not on file   Housing Stability:     Unable to Pay for Housing in the Last Year: Not on file    Number of [place] in the Last Year: Not on file    Unstable Housing in the Last Year: Not on file     Current Outpatient Medications on File Prior to Visit   Medication Sig Dispense Refill    [DISCONTINUED] Allergy Relief, cetirizine, 10 mg tablet TAKE 1 TABLET BY MOUTH NIGHTLY. INDICATIONS: INFLAMMATION OF THE NOSE DUE TO AN ALLERGY 90 Tablet 3    [DISCONTINUED] famotidine (PEPCID) 40 mg tablet TAKE 1 TABLET BY MOUTH TWICE A DAY      [DISCONTINUED] methocarbamoL (ROBAXIN) 500 mg tablet       [DISCONTINUED] OXcarbazepine (TRILEPTAL) 300 mg tablet TAKE 1 TABLET BY MOUTH TWICE A  Tab 1     No current facility-administered medications on file prior to visit. No Known Allergies  ROS  See HPI for pertinent ROS. Objective  Visit Vitals  /74 (BP 1 Location: Right upper arm, BP Patient Position: Sitting)   Pulse 96   Temp 97.8 °F (36.6 °C) (Temporal)   Resp 16   Ht 5' 11\" (1.803 m)   Wt 135 lb (61.2 kg)   SpO2 100%   BMI 18.83 kg/m²       Physical Exam  Vitals and nursing note reviewed.    Constitutional:       General: He is not in acute distress. Appearance: Normal appearance. He is normal weight. HENT:      Head: Normocephalic. Eyes:      Extraocular Movements: Extraocular movements intact. Cardiovascular:      Rate and Rhythm: Normal rate and regular rhythm. Heart sounds: Normal heart sounds. Pulmonary:      Effort: Pulmonary effort is normal.      Breath sounds: Normal breath sounds. Musculoskeletal:         General: Normal range of motion. Right lower leg: No edema. Left lower leg: No edema. Skin:     General: Skin is warm and dry. Neurological:      Mental Status: He is alert and oriented to person, place, and time. Psychiatric:         Mood and Affect: Mood normal.         Behavior: Behavior normal.          Assessment & Plan      ICD-10-CM ICD-9-CM    1. Anxiety and depression  F41.9 300.00 sertraline (ZOLOFT) 100 mg tablet    F32. A 311 LORazepam (ATIVAN) 0.5 mg tablet   2. Panic attacks  F41.0 300.01 LORazepam (ATIVAN) 0.5 mg tablet   3. Positive depression screening  Z13.31 796.4    4. Alcohol intake above recommended sensible limits  Z72.89 305.00      Diagnoses and all orders for this visit:    1. Anxiety and depression  Restart sertraline as ordered which he has done well with in the past.    Reminded side effects are worse during the first week and then improve (feeling stimulated or sedated, upset stomach, dry mouth, headache, or sexual difficulties). Instructed to take medication daily and not stop abruptly. Reminded it can take 6-8 weeks to reach therapeutic dosing. Also reminded of possible SI in some people on SSRI's. Understands to seek immediate medical attention if that should occur. Schedule with previous counselor for ongoing counseling.      -     sertraline (ZOLOFT) 100 mg tablet; Take 1/2 tablet daily for the first two weeks, then start 1 tablet daily.  -     LORazepam (ATIVAN) 0.5 mg tablet; Take 1 Tablet by mouth every eight (8) hours as needed for Anxiety.  Max Daily Amount: 1.5 mg.    2. Panic attacks   checked, no misuse or abuse noted. Ativan as needed while waiting for sertraline to take effect.   -     LORazepam (ATIVAN) 0.5 mg tablet; Take 1 Tablet by mouth every eight (8) hours as needed for Anxiety. Max Daily Amount: 1.5 mg.    3. Positive depression screening  As above. 4. Alcohol intake above recommended sensible limits  Advised to avoid alcohol at all times due to panic attacks. Follow-up and Dispositions    · Return in about 1 month (around 1/21/2022) for follow up, anxiety, depression, nonfasting, VV ok.            Teri Cruz, NP

## 2021-12-21 NOTE — PROGRESS NOTES
Chief Complaint   Patient presents with    Other     anxiety/depression   1. Have you been to the ER, urgent care clinic since your last visit? Hospitalized since your last visit? Yes Reason for visit: MCV, anxiety attack, 12/19/2021    2. Have you seen or consulted any other health care providers outside of the [address] [place] [address] since your last visit? Include any pap smears or colon screening.  No   3 most recent PHQ Screens 12/21/2021   Little interest or pleasure in doing things Several days   Feeling down, depressed, irritable, or hopeless Nearly every day   Total Score PHQ 2 4   Trouble falling or staying asleep, or sleeping too much Several days   Feeling tired or having little energy More than half the days   Poor appetite, weight loss, or overeating Not at all   Feeling bad about yourself - or that you are a failure or have let yourself or your family down Several days   Trouble concentrating on things such as school, work, reading, or watching TV More than half the days   Moving or speaking so slowly that other people could have noticed; or the opposite being so fidgety that others notice Several days   Thoughts of being better off dead, or hurting yourself in some way Not at all   PHQ 9 Score 11   How difficult have these problems made it for you to do your work, take care of your home and get along with others Somewhat difficult     Visit Vitals  /74 (BP 1 Location: Right upper arm, BP Patient Position: Sitting)   Pulse 96   Temp 97.8 °F (36.6 °C) (Temporal)   Resp 16   Ht 5' 11\" (1.803 m)   Wt 135 lb (61.2 kg)   SpO2 100%   BMI 18.83 kg/m²

## 2022-01-21 ENCOUNTER — VIRTUAL VISIT (OUTPATIENT)
Dept: FAMILY MEDICINE CLINIC | Age: 37
End: 2022-01-21
Payer: COMMERCIAL

## 2022-01-21 DIAGNOSIS — F41.9 ANXIETY AND DEPRESSION: Primary | ICD-10-CM

## 2022-01-21 DIAGNOSIS — F32.A ANXIETY AND DEPRESSION: Primary | ICD-10-CM

## 2022-01-21 PROCEDURE — 99214 OFFICE O/P EST MOD 30 MIN: CPT | Performed by: NURSE PRACTITIONER

## 2022-01-21 RX ORDER — SERTRALINE HYDROCHLORIDE 50 MG/1
TABLET, FILM COATED ORAL
COMMUNITY
Start: 2022-01-04

## 2022-01-21 NOTE — PROGRESS NOTES
Chief Complaint   Patient presents with    Follow-up     anxiety/depression   1. Have you been to the ER, urgent care clinic since your last visit? Hospitalized since your last visit? No    2. Have you seen or consulted any other health care providers outside of the [address] [place] [address] since your last visit? Include any pap smears or colon screening.  Yes Reason for visit: has seen psychiatry

## 2022-01-21 NOTE — PROGRESS NOTES
Consent: Marie Rodríguez, who was seen by synchronous (real-time) audio-video technology, and/or his healthcare decision maker, is aware that this patient-initiated, Telehealth encounter on 1/21/2022 is a billable service, with coverage as determined by his insurance carrier. He is aware that he may receive a bill and has provided verbal consent to proceed: YES-Consent obtained within past 12 months        712  Subjective: Marie Rodríguez is a 31 y.o. male who was seen for Follow-up (anxiety/depression)  Patient presents for management of anxiety and depression. Saw psychiatry in January who decreased dose to 50mg daily. Will be following with psychiatry for ongoing management. Prior to Admission medications    Medication Sig Start Date End Date Taking? Authorizing Provider   sertraline (ZOLOFT) 50 mg tablet  1/4/22  Yes Provider, Historical   sertraline (ZOLOFT) 100 mg tablet Take 1/2 tablet daily for the first two weeks, then start 1 tablet daily. 12/21/21  Yes Angela Dalton NP   LORazepam (ATIVAN) 0.5 mg tablet Take 1 Tablet by mouth every eight (8) hours as needed for Anxiety. Max Daily Amount: 1.5 mg. 12/21/21  Yes Angela Dalton NP     No Known Allergies  Patient Active Problem List    Diagnosis    Abdominal pain    Anxiety and depression    Gastritis    Epilepsy ([facility]         ROS  See HPI for pertinent ROS. Objective:   Vital Signs: (As obtained by patient/caregiver at home)  There were no vitals taken for this visit.      [INSTRUCTIONS:  \"[x]\" Indicates a positive item  \"[]\" Indicates a negative item  -- DELETE ALL ITEMS NOT EXAMINED]    Constitutional: [x] Appears well-developed and well-nourished [x] No apparent distress      [] Abnormal -     Mental status: [x] Alert and awake  [x] Oriented to person/place/time [x] Able to follow commands    [] Abnormal -     Eyes:   EOM    [x]  Normal    [] Abnormal -   Sclera  [x]  Normal    [] Abnormal -          Discharge [x] None visible   [] Abnormal -     HENT: [x] Normocephalic, atraumatic  [] Abnormal -   [x] Mouth/Throat: Mucous membranes are moist    External Ears [x] Normal  [] Abnormal -    Neck: [x] No visualized mass [] Abnormal -     Pulmonary/Chest: [x] Respiratory effort normal   [x] No visualized signs of difficulty breathing or respiratory distress        [] Abnormal -        Neurological:        [x] No Facial Asymmetry (Cranial nerve 7 motor function) (limited exam due to video visit)          [x] No gaze palsy        [] Abnormal -          Skin:        [x] No significant exanthematous lesions or discoloration noted on facial skin         [] Abnormal -            Psychiatric:       [x] Normal Affect [] Abnormal -        [x] No Hallucinations    Other pertinent observable physical exam findings:-              Assessment & Plan:   Diagnoses and all orders for this visit:    1. Anxiety and depression  Continue sertraline 50 mg daily as prescribed by psychiatry. Reminded to take medication daily and do not abruptly discontinue. Follow-up with psychiatry as recommended. Call if we can be of assistance in the future. Follow-up and Dispositions    · Return if symptoms worsen or fail to improve. We discussed the expected course, resolution and complications of the diagnosis(es) in detail. Medication risks, benefits, costs, interactions, and alternatives were discussed as indicated. I advised him to contact the office if his condition worsens, changes or fails to improve as anticipated. He expressed understanding with the diagnosis(es) and plan. Nataly Fletcher is a 31 y.o. male being evaluated by a video visit encounter for concerns as above. A caregiver was present when appropriate.  Due to this being a TeleHealth encounter (During [place] [address] public health emergency), evaluation of the following organ systems was limited: Vitals/Constitutional/EENT/Resp/CV/GI//MS/Neuro/Skin/Heme-Lymph-Imm. Pursuant to the emergency declaration under the [address] [place] [address], [facility] waiver authority and the [employer] Act, this Virtual  Visit was conducted, with patient's (and/or legal guardian's) consent, to reduce the patient's risk of exposure to COVID-19 and provide necessary medical care. Services were provided through a video synchronous discussion virtually to substitute for in-person clinic visit. Patient and provider were located at their individual homes.         Shital Foley NP

## 2022-03-18 PROBLEM — G40.909 EPILEPSY (HCC): Status: ACTIVE | Noted: 2020-08-14

## 2022-03-18 PROBLEM — F41.9 ANXIETY AND DEPRESSION: Status: ACTIVE | Noted: 2021-02-19

## 2022-03-18 PROBLEM — F32.A ANXIETY AND DEPRESSION: Status: ACTIVE | Noted: 2021-02-19

## 2022-03-18 PROBLEM — K29.70 GASTRITIS: Status: ACTIVE | Noted: 2021-02-19

## 2022-03-20 PROBLEM — R10.9 ABDOMINAL PAIN: Status: ACTIVE | Noted: 2021-02-19

## 2022-05-15 DIAGNOSIS — F32.A ANXIETY AND DEPRESSION: ICD-10-CM

## 2022-05-15 DIAGNOSIS — F41.9 ANXIETY AND DEPRESSION: ICD-10-CM

## 2022-05-16 RX ORDER — SERTRALINE HYDROCHLORIDE 100 MG/1
TABLET, FILM COATED ORAL
Qty: 90 TABLET | Refills: 0 | Status: SHIPPED | OUTPATIENT
Start: 2022-05-16 | End: 2022-08-18

## 2022-06-01 DIAGNOSIS — F32.A ANXIETY AND DEPRESSION: ICD-10-CM

## 2022-06-01 DIAGNOSIS — F41.9 ANXIETY AND DEPRESSION: ICD-10-CM

## 2022-06-01 DIAGNOSIS — F41.0 PANIC ATTACKS: ICD-10-CM

## 2022-06-02 RX ORDER — LORAZEPAM 0.5 MG/1
0.5 TABLET ORAL
Qty: 15 TABLET | Refills: 0 | Status: SHIPPED | OUTPATIENT
Start: 2022-06-02

## 2022-12-09 ENCOUNTER — TELEPHONE (OUTPATIENT)
Dept: FAMILY MEDICINE CLINIC | Age: 37
End: 2022-12-09

## 2022-12-09 NOTE — TELEPHONE ENCOUNTER
Reason for call: Pt calling-he was very upset because he has been on hold for 1 hour with the [facility], trying to schedule an appt just for them to tell him he has no PCP. He was very upset about this and got argumentative with the call center, as they warned me. He then spoke with me, I explained he technically does have a PCP, Gael Richardson, as he has seen her twice after [facility] left, taking over his care. He said well I'm not sure why the call center would say I have no doctor. I explained again to him that they do not see that, so I have populated on his chart that Paloma Mcdonald is his PCP only we can see that. He is asking for an appt for right jaw pain. Told him we have no appts today, but if he calls Monday morning at 8:00am we can try to schedule him a same day slot that day, or he can go to . He said well I don't understand how I called today doing the same exact thing you're telling me to do on Monday, and you're telling me you have no spots today? So I have to call to get a \"chance\" to get an appt correct? I said no, and the reason we are booked today is because on Fridays, there are less spots open than on Monday, as the providers are only virtual in the afternoon on Fridays. He said so you're lying to patients correct? I said no. He said so what if someone is sick? I explained we do have open slots for sick patients but only by appointment we are not a walk in clinic. Pt went back and forth on me arguing for 30+ minutes, I asked him to please hold as I have patients I have to check in and out. Let him know I would tell my manager about his concerns and she will call him back after her meeting, and I am going to disconnect due to the arguing. He again attempted to argue and I hung up.     Is this a new problem: yes     Date of last appointment:  1/21/2022     Can we respond via [employer]: no    Best call back number: [phone]

## 2023-05-19 RX ORDER — LORAZEPAM 0.5 MG/1
0.5 TABLET ORAL EVERY 8 HOURS PRN
COMMUNITY
Start: 2022-06-02

## 2023-05-19 RX ORDER — SERTRALINE HYDROCHLORIDE 100 MG/1
100 TABLET, FILM COATED ORAL DAILY
COMMUNITY
Start: 2022-08-18